# Patient Record
Sex: MALE | Employment: FULL TIME | URBAN - METROPOLITAN AREA
[De-identification: names, ages, dates, MRNs, and addresses within clinical notes are randomized per-mention and may not be internally consistent; named-entity substitution may affect disease eponyms.]

---

## 2017-06-26 ENCOUNTER — APPOINTMENT (OUTPATIENT)
Dept: GENERAL RADIOLOGY | Age: 52
End: 2017-06-26
Attending: EMERGENCY MEDICINE
Payer: COMMERCIAL

## 2017-06-26 ENCOUNTER — APPOINTMENT (OUTPATIENT)
Dept: NUCLEAR MEDICINE | Age: 52
End: 2017-06-26
Attending: NURSE PRACTITIONER
Payer: COMMERCIAL

## 2017-06-26 ENCOUNTER — HOSPITAL ENCOUNTER (EMERGENCY)
Age: 52
Discharge: HOME | End: 2017-06-26
Attending: EMERGENCY MEDICINE
Payer: COMMERCIAL

## 2017-06-26 VITALS
DIASTOLIC BLOOD PRESSURE: 70 MMHG | RESPIRATION RATE: 10 BRPM | BODY MASS INDEX: 28.56 KG/M2 | OXYGEN SATURATION: 99 % | TEMPERATURE: 98.2 F | HEIGHT: 71 IN | SYSTOLIC BLOOD PRESSURE: 106 MMHG | HEART RATE: 73 BPM | WEIGHT: 204 LBS

## 2017-06-26 DIAGNOSIS — R07.89 ATYPICAL CHEST PAIN: Primary | ICD-10-CM

## 2017-06-26 LAB
ALBUMIN SERPL BCP-MCNC: 3.9 G/DL (ref 3.5–5)
ALBUMIN/GLOB SERPL: 1.3 {RATIO} (ref 1.1–2.2)
ALP SERPL-CCNC: 87 U/L (ref 45–117)
ALT SERPL-CCNC: 27 U/L (ref 12–78)
ANION GAP BLD CALC-SCNC: 12 MMOL/L (ref 5–15)
AST SERPL W P-5'-P-CCNC: 35 U/L (ref 15–37)
ATRIAL RATE: 65 BPM
ATRIAL RATE: 67 BPM
BASOPHILS # BLD AUTO: 0 K/UL (ref 0–0.1)
BASOPHILS # BLD: 0 % (ref 0–1)
BILIRUB SERPL-MCNC: 0.7 MG/DL (ref 0.2–1)
BUN SERPL-MCNC: 16 MG/DL (ref 6–20)
BUN/CREAT SERPL: 13 (ref 12–20)
CALCIUM SERPL-MCNC: 9 MG/DL (ref 8.5–10.1)
CALCULATED P AXIS, ECG09: 29 DEGREES
CALCULATED P AXIS, ECG09: 3 DEGREES
CALCULATED R AXIS, ECG10: 33 DEGREES
CALCULATED R AXIS, ECG10: 41 DEGREES
CALCULATED T AXIS, ECG11: 49 DEGREES
CALCULATED T AXIS, ECG11: 54 DEGREES
CHLORIDE SERPL-SCNC: 105 MMOL/L (ref 97–108)
CO2 SERPL-SCNC: 23 MMOL/L (ref 21–32)
CREAT SERPL-MCNC: 1.19 MG/DL (ref 0.7–1.3)
DIAGNOSIS, 93000: NORMAL
DIAGNOSIS, 93000: NORMAL
EOSINOPHIL # BLD: 0.1 K/UL (ref 0–0.4)
EOSINOPHIL NFR BLD: 1 % (ref 0–7)
ERYTHROCYTE [DISTWIDTH] IN BLOOD BY AUTOMATED COUNT: 12.4 % (ref 11.5–14.5)
GLOBULIN SER CALC-MCNC: 3 G/DL (ref 2–4)
GLUCOSE SERPL-MCNC: 109 MG/DL (ref 65–100)
HCT VFR BLD AUTO: 39.2 % (ref 36.6–50.3)
HGB BLD-MCNC: 13 G/DL (ref 12.1–17)
LYMPHOCYTES # BLD AUTO: 34 % (ref 12–49)
LYMPHOCYTES # BLD: 2.1 K/UL (ref 0.8–3.5)
MAGNESIUM SERPL-MCNC: 2 MG/DL (ref 1.6–2.4)
MCH RBC QN AUTO: 31.3 PG (ref 26–34)
MCHC RBC AUTO-ENTMCNC: 33.2 G/DL (ref 30–36.5)
MCV RBC AUTO: 94.5 FL (ref 80–99)
MONOCYTES # BLD: 0.6 K/UL (ref 0–1)
MONOCYTES NFR BLD AUTO: 10 % (ref 5–13)
NEUTS SEG # BLD: 3.3 K/UL (ref 1.8–8)
NEUTS SEG NFR BLD AUTO: 55 % (ref 32–75)
P-R INTERVAL, ECG05: 164 MS
P-R INTERVAL, ECG05: 196 MS
PLATELET # BLD AUTO: 212 K/UL (ref 150–400)
POTASSIUM SERPL-SCNC: 3.4 MMOL/L (ref 3.5–5.1)
PROT SERPL-MCNC: 6.9 G/DL (ref 6.4–8.2)
Q-T INTERVAL, ECG07: 412 MS
Q-T INTERVAL, ECG07: 416 MS
QRS DURATION, ECG06: 100 MS
QRS DURATION, ECG06: 102 MS
QTC CALCULATION (BEZET), ECG08: 428 MS
QTC CALCULATION (BEZET), ECG08: 439 MS
RBC # BLD AUTO: 4.15 M/UL (ref 4.1–5.7)
SODIUM SERPL-SCNC: 140 MMOL/L (ref 136–145)
TROPONIN I BLD-MCNC: <0.04 NG/ML (ref 0–0.08)
TROPONIN I SERPL-MCNC: <0.04 NG/ML
TROPONIN I SERPL-MCNC: <0.04 NG/ML
VENTRICULAR RATE, ECG03: 65 BPM
VENTRICULAR RATE, ECG03: 67 BPM
WBC # BLD AUTO: 6.1 K/UL (ref 4.1–11.1)

## 2017-06-26 PROCEDURE — 80053 COMPREHEN METABOLIC PANEL: CPT | Performed by: EMERGENCY MEDICINE

## 2017-06-26 PROCEDURE — A9500 TC99M SESTAMIBI: HCPCS

## 2017-06-26 PROCEDURE — 84484 ASSAY OF TROPONIN QUANT: CPT | Performed by: EMERGENCY MEDICINE

## 2017-06-26 PROCEDURE — 74011250637 HC RX REV CODE- 250/637: Performed by: NURSE PRACTITIONER

## 2017-06-26 PROCEDURE — 74011250637 HC RX REV CODE- 250/637: Performed by: EMERGENCY MEDICINE

## 2017-06-26 PROCEDURE — 85025 COMPLETE CBC W/AUTO DIFF WBC: CPT | Performed by: EMERGENCY MEDICINE

## 2017-06-26 PROCEDURE — 93306 TTE W/DOPPLER COMPLETE: CPT

## 2017-06-26 PROCEDURE — 36415 COLL VENOUS BLD VENIPUNCTURE: CPT | Performed by: EMERGENCY MEDICINE

## 2017-06-26 PROCEDURE — 99285 EMERGENCY DEPT VISIT HI MDM: CPT

## 2017-06-26 PROCEDURE — 83735 ASSAY OF MAGNESIUM: CPT | Performed by: NURSE PRACTITIONER

## 2017-06-26 PROCEDURE — 93041 RHYTHM ECG TRACING: CPT

## 2017-06-26 PROCEDURE — 71020 XR CHEST PA LAT: CPT

## 2017-06-26 PROCEDURE — 93005 ELECTROCARDIOGRAM TRACING: CPT

## 2017-06-26 RX ORDER — METFORMIN HYDROCHLORIDE 1000 MG/1
1000 TABLET ORAL 2 TIMES DAILY WITH MEALS
COMMUNITY

## 2017-06-26 RX ORDER — ATORVASTATIN CALCIUM 80 MG/1
80 TABLET, FILM COATED ORAL DAILY
COMMUNITY

## 2017-06-26 RX ORDER — PIOGLITAZONEHYDROCHLORIDE 15 MG/1
45 TABLET ORAL DAILY
COMMUNITY

## 2017-06-26 RX ORDER — ATORVASTATIN CALCIUM 20 MG/1
80 TABLET, FILM COATED ORAL DAILY
COMMUNITY
End: 2017-06-26

## 2017-06-26 RX ORDER — LISINOPRIL 10 MG/1
10 TABLET ORAL DAILY
COMMUNITY

## 2017-06-26 RX ORDER — FENOFIBRATE 48 MG/1
48 TABLET, COATED ORAL DAILY
COMMUNITY
End: 2017-06-26

## 2017-06-26 RX ORDER — SODIUM CHLORIDE 0.9 % (FLUSH) 0.9 %
20 SYRINGE (ML) INJECTION
Status: COMPLETED | OUTPATIENT
Start: 2017-06-26 | End: 2017-06-26

## 2017-06-26 RX ORDER — FENOFIBRATE 145 MG/1
TABLET, COATED ORAL DAILY
COMMUNITY

## 2017-06-26 RX ORDER — POTASSIUM CHLORIDE 750 MG/1
40 TABLET, FILM COATED, EXTENDED RELEASE ORAL
Status: COMPLETED | OUTPATIENT
Start: 2017-06-26 | End: 2017-06-26

## 2017-06-26 RX ADMIN — Medication 10 ML: at 13:32

## 2017-06-26 RX ADMIN — NITROGLYCERIN 1 INCH: 20 OINTMENT TOPICAL at 06:37

## 2017-06-26 RX ADMIN — POTASSIUM CHLORIDE 40 MEQ: 750 TABLET, FILM COATED, EXTENDED RELEASE ORAL at 09:52

## 2017-06-26 NOTE — ED NOTES
1:59 PM  Change of shift. Care of patient taken over from Dr. Andrea Escalante; H&P reviewed, handoff complete. Awaiting stress test results. PROGRESS NOTE:  3:54 PM  Nuclear stress test negative. Will discharge home with close cards follow up.

## 2017-06-26 NOTE — ED PROVIDER NOTES
HPI Comments: 46 y.o. male with past medical history significant for DM, CAD, elevated cholesterol who presents via EMS with chief complaint of chest pain. Patient states he woke up this morning around \"6768\" experiencing pain in left shoulder and left arm. He explains that he thought he \"slept on shoulder wrong\" but notes pain persisted. Patient notes he proceeded to develop tightness in mid-right anterior chest and reports associated diaphoresis, mild nausea and SOB secondary to hyperventilating. Patient explains that sx began to resolve upon EMS arrival. Patient was given 325 mg ASA en route. Upon arrival to ED, patient notes he still has chest tightness and mild left shoulder pain. He denies any exacerbation in chest discomfort with deep breathing or palpation. Patient denies any recent travel, surgery or plane rides. He denies any fever, chills, rhinorrhea, cough, congestion, abdominal pain, urinary problems. There are no other acute medical concerns at this time. Social hx: non-smoker. +ETOH use; socially. Denies drug use. PCP: Chery Demarco MD    Note written by Fawn Kidd. Arin Ferreira, as dictated by Brynn Severin, MD 6:32 AM      The history is provided by the patient and the spouse. No  was used. No past medical history on file. No past surgical history on file. No family history on file. Social History     Social History    Marital status:      Spouse name: N/A    Number of children: N/A    Years of education: N/A     Occupational History    Not on file. Social History Main Topics    Smoking status: Not on file    Smokeless tobacco: Not on file    Alcohol use Not on file    Drug use: Not on file    Sexual activity: Not on file     Other Topics Concern    Not on file     Social History Narrative         ALLERGIES: Review of patient's allergies indicates no known allergies.     Review of Systems   Constitutional: Positive for diaphoresis (resolved). Negative for chills, fatigue and fever. HENT: Negative for congestion, rhinorrhea and sore throat. Eyes: Negative for visual disturbance. Respiratory: Positive for chest tightness (mid-right ) and shortness of breath (resolved). Negative for cough. Gastrointestinal: Positive for nausea (mild; resolved). Negative for abdominal pain, diarrhea and vomiting. Genitourinary: Negative for difficulty urinating, dysuria and hematuria. Musculoskeletal: Positive for arthralgias (left shoulder; mild) and myalgias (left arm; resolved). Negative for back pain and neck pain. Skin: Negative for rash and wound. Neurological: Negative for dizziness and headaches. All other systems reviewed and are negative. Vitals:    06/26/17 0629 06/26/17 0700 06/26/17 0730 06/26/17 0800   BP: 130/74 118/70 122/66 126/67   Pulse: 79 68 67 70   Resp: 20 14 12 17   Temp: 98 °F (36.7 °C)      SpO2: 100% 99% 99% 99%   Weight: 92.5 kg (204 lb)      Height: 5' 11\" (1.803 m)               Physical Exam   Const:  No acute distress, well developed, well nourished  Head:  Atraumatic, normocephalic  Eyes:  PERRL, conjunctiva normal, no scleral icterus  Neck:  Supple, trachea midline  Cardiovascular:  RRR, no murmurs, no gallops, no rubs  Resp:  No resp distress, no increased work of breathing, no wheezes, no rhonchi, no rales,  Abd:  Soft, non-tender, non-distended, no rebound, no guarding, no CVA tenderness  :  Deferred  MSK:  No pedal edema, normal ROM  Neuro:  Alert and oriented x3, no cranial nerve defect  Skin:  Warm, dry, intact  Psych: normal mood and affect, behavior is normal, judgement and thought content is normal  Note written by Terence Tracey, as dictated by Arianna Carcamo MD 6:32 AM      MDM  Number of Diagnoses or Management Options  Atypical chest pain:      Amount and/or Complexity of Data Reviewed  Clinical lab tests: ordered and reviewed  Tests in the radiology section of CPT®: ordered and reviewed  Review and summarize past medical records: yes    Patient Progress  Patient progress: stable    ED Course     Pt. Presents to the ER with atypical CP sx. He says that he is feeling better and is pain free in the ER. Negative cardiac enzymes. Pt. Seen by cardiology and to have a stress echo. Pt. Signed out to Dr. Guy Gardiner to f/u on those results. Procedures    CONSULT NOTE:  8:31 AM Janay Whatley MD spoke with Dr. Brendon Strange, Consult for Cardiology. Discussed available diagnostic tests and clinical findings. She is in agreement with care plans as outlined. Dr. Brendon Strange will see patient.

## 2017-06-26 NOTE — ED TRIAGE NOTES
Triage: Woke up at 0530 with left shoulder pain and shortness of breath, + diaphoretic also complaining of chest discomfort 3/10.  325 Mg ASA given in route EKG- NSR. No cardiac history.

## 2017-06-26 NOTE — CONSULTS
CARDIOLOGY CONSULT NOTE     [facility] [address], [address] [place] [address], [facility]   [phone] fax [phone]    Name: Elida Elias  1965 [id number]  6/29/2017 8:46 AM     Assessment/Plan:     1. Chest pain/left arm pain - initial troponin negative and initial ECG without acute ischemic changes, symptoms improved with NTG and ASA, will repeat troponin and ECG now, will proceed with exercise nuclear stress test to assess for ischemia given his symptoms and risk factors for CAD  2. Dyslipidemia - on atorvastatin and tricor   3. DM type 2 - most recent A1C 7.0% per his report, on metformin, januvia and actos, on lisinopril as well but denies hx of HTN  4. Hypokalemia - K 3.4, will give KCL 40meq PO once now    Soc Hx: no tobacco use, drinks 1-2 beers every other week, no drug use  Fam Hx: mother with PCI/stent in her [de-identified], has aortic aneurysm and HTN and now in her 80s, father with HTN and dyslipidemia in his 80s    Admit Date: 6/26/2017     Admit Diagnosis: There are no admission diagnoses documented for this encounter. Primary Care Physician:Myra Sanders MD     Attending Provider: No att. providers found  Primary Cardiologist: None  Consulting Cardiologist: Dr. Britney Martinez: Chest pain  Requesting Physician: Dr. Sivakumar Darby    Subjective:     Elida Elias is a 46 y.o. male with hx of dyslipidemia and DM type 2 who presented to ER this AM with c/o left arm pain, chest pain and diaphoresis. Patient states he woke up this morning around 515am and noticed pain in his left arm and shoulder. Initially he thought he had \"slept on his shoulder wrong\" but notes pain persisted and about 30 minutes later he developed midsternal chest tightness with associated diaphoresis, dizziness and \"hyperventilating. \"  He called EMS and came to ER. Says his symptoms are very mild now and estimates that he got his NTG paste almost an hour ago now.   Patient was given 325 mg ASA en route. Says he has had milder forms of chest tightness before, not associated with activity and usually very brief without any other associated symptoms. Denies frequent dizziness at home, only has had syncope in relation to donating blood. Denies any LE edema. Reports having palpitations occasionally, \"not that often\", and episodes last a minute or less. Admits to drinking about 6-8 cups of coffee/day, sometimes as many as 10 cups of coffee if he is really tired and associated episodes of palpitations with increased coffee intake. He has not had any coffee yet today. Walks 1.5 hours three days/week. Admits to being under a lot of stress with kids in college and trying to figure out what to do with elderly parents. Review of Symptoms:  Pertinent items are noted in HPI. Previous treatment/evaluation includes none . Cardiac risk factors: dyslipidemia, diabetes mellitus, male gender, stress. Past Medical History:   Diagnosis Date    CAD (coronary artery disease)     [facility]      History reviewed. No pertinent surgical history. No current facility-administered medications for this encounter. Current Outpatient Prescriptions   Medication Sig    lisinopril (PRINIVIL, ZESTRIL) 10 mg tablet Take 10 mg by mouth daily.  SITagliptin (JANUVIA) 50 mg tablet Take 50 mg by mouth daily.  pioglitazone (ACTOS) 15 mg tablet Take 45 mg by mouth daily.  metFORMIN (GLUCOPHAGE) 1,000 mg tablet Take 1,000 mg by mouth two (2) times daily (with meals).  atorvastatin (LIPITOR) 80 mg tablet Take 80 mg by mouth daily.  fenofibrate nanocrystallized (TRICOR) 145 mg tablet Take  by mouth daily. No Known Allergies   History reviewed. No pertinent family history.    Social History     Social History    Marital status:      Spouse name: N/A    Number of children: N/A    Years of education: N/A     Social History Main Topics    Smoking status: Never Smoker    Smokeless tobacco: Never Used    Alcohol use Yes      Comment: social drinker    Drug use: No    Sexual activity: Not Asked     Other Topics Concern    None     Social History Narrative    None          Objective:      Physical Exam  Vitals:    06/26/17 1230 06/26/17 1541 06/26/17 1542 06/26/17 1630   BP: 116/69 128/53 128/53 106/70   Pulse: 76 80  73   Resp: 19 18  10   Temp:  98 °F (36.7 °C)  98.2 °F (36.8 °C)   SpO2: 100% 99%  99%   Weight:       Height:           General:  Alert, cooperative, no distress, appears stated age. Eyes:  Conjunctivae/corneas clear. Ears:  Normal external ear canals both ears. Nose: Nares normal.     Mouth/Throat: Moist mucous membranes. Neck: Supple, symmetrical, trachea midline, no carotid bruit and no JVD. Back:   Symmetric, no curvature. ROM normal.    Lungs:   Clear to auscultation bilaterally. Heart:  Regular rate and rhythm, S1, S2 normal, no murmur, click, rub or gallop. Abdomen:   Soft, non-tender. Bowel sounds normal. No masses,  No organomegaly. Extremities: Extremities normal, atraumatic, no cyanosis or edema. Vascular: 2+ and symmetric all extremities. Skin: Skin color normal. No rashes or lesions   Lymph nodes: Not assessed   Neurologic: CNII-XII intact. Normal strength throughout. Telemetry: normal sinus rhythm  ECG: normal EKG, normal sinus rhythm    Data Review:     No results for input(s): CPK, TROIQ in the last 72 hours. No lab exists for component: CKQMB, CPKMB, BMPP  No results for input(s): NA, K, CL, CO2, BUN, CREA, GLU, PHOS, CA in the last 72 hours. No results for input(s): WBC, HGB, HCT, PLT, HGBEXT, HCTEXT, PLTEXT, HGBEXT, HCTEXT, PLTEXT in the last 72 hours. No results for input(s): PTP, INR, GPT, SGOT, AP in the last 72 hours. No lab exists for component: PTTP, INREXT, INREXT  No results for input(s): CHOL, LDLC in the last 72 hours.     No lab exists for component: TGL, HDLC,  HBA1C  No results for input(s): CRP, TSH, TSHEXT, TSHEXT in the last 72 hours. No lab exists for component: ESR  Thank you very much for this referral. I appreciate the opportunity to participate in this patient's care. I will follow along with above stated plan.     Patel Mcmullen MD  [facility] [address], [address] [place] [address]  [place] [phone] MD Tommy

## 2017-06-26 NOTE — ED NOTES
Patient has received discharge instructions from ER MD, verbalizes understanding. Ambulatory upon discharge with wife.

## 2017-06-26 NOTE — DISCHARGE INSTRUCTIONS
We hope that we have addressed all of your medical concerns. The examination and treatment you received in the Emergency Department were for an emergent problem and were not intended as complete care. It is important that you follow up with your healthcare provider(s) for ongoing care. If your symptoms worsen or do not improve as expected, and you are unable to reach your usual health care provider(s), you should return to the Emergency Department. Today's healthcare is undergoing tremendous change, and patient satisfaction surveys are one of the many tools to assess the quality of medical care. You may receive a survey from the [employer] regarding your experience in the Emergency Department. I hope that your experience has been completely positive, particularly the medical care that I provided. As such, please participate in the survey; anything less than excellent does not meet my expectations or intentions. [facility] and [facility] participate in nationally recognized quality of care measures. If your blood pressure is greater than 120/80, as reported below, we urge that you seek medical care to address the potential of high blood pressure, commonly known as hypertension. Hypertension can be hereditary or can be caused by certain medical conditions, pain, stress, or \"white coat syndrome. \"       Please make an appointment with your health care provider(s) for follow up of your Emergency Department visit.        VITALS:   Patient Vitals for the past 8 hrs:   Temp Pulse Resp BP SpO2   06/26/17 1542 - - - 128/53 -   06/26/17 1541 98 °F (36.7 °C) 80 18 128/53 99 %   06/26/17 1230 - 76 19 116/69 100 %   06/26/17 1200 - 65 14 110/62 98 %   06/26/17 1130 - 70 13 118/70 100 %   06/26/17 1100 - 71 16 112/61 100 %   06/26/17 1030 - 72 12 117/67 99 %   06/26/17 0800 - 70 17 126/67 99 %          Thank you for allowing us to provide you with medical care today. We realize that you have many choices for your emergency care needs. Please choose us in the future for any continued health care needs. Gila Pratt[address].   Office: [phone]            Recent Results (from the past 24 hour(s))   EKG, 12 LEAD, INITIAL    Collection Time: 06/26/17  6:24 AM   Result Value Ref Range    Ventricular Rate 65 BPM    Atrial Rate 65 BPM    P-R Interval 196 ms    QRS Duration 102 ms    Q-T Interval 412 ms    QTC Calculation (Bezet) 428 ms    Calculated P Axis 29 degrees    Calculated R Axis 41 degrees    Calculated T Axis 54 degrees    Diagnosis Normal sinus rhythm  No previous ECGs available      POC TROPONIN-I    Collection Time: 06/26/17  6:30 AM   Result Value Ref Range    Troponin-I (POC) <0.04 0.00 - 0.08 ng/mL   CBC WITH AUTOMATED DIFF    Collection Time: 06/26/17  6:34 AM   Result Value Ref Range    WBC 6.1 4.1 - 11.1 K/uL    RBC 4.15 4.10 - 5.70 M/uL    HGB 13.0 12.1 - 17.0 g/dL    HCT 39.2 36.6 - 50.3 %    MCV 94.5 80.0 - 99.0 FL    MCH 31.3 26.0 - 34.0 PG    MCHC 33.2 30.0 - 36.5 g/dL    RDW 12.4 11.5 - 14.5 %    PLATELET 912 222 - 662 K/uL    NEUTROPHILS 55 32 - 75 %    LYMPHOCYTES 34 12 - 49 %    MONOCYTES 10 5 - 13 %    EOSINOPHILS 1 0 - 7 %    BASOPHILS 0 0 - 1 %    ABS. NEUTROPHILS 3.3 1.8 - 8.0 K/UL    ABS. LYMPHOCYTES 2.1 0.8 - 3.5 K/UL    ABS. MONOCYTES 0.6 0.0 - 1.0 K/UL    ABS. EOSINOPHILS 0.1 0.0 - 0.4 K/UL    ABS.  BASOPHILS 0.0 0.0 - 0.1 K/UL   METABOLIC PANEL, COMPREHENSIVE    Collection Time: 06/26/17  6:34 AM   Result Value Ref Range    Sodium 140 136 - 145 mmol/L    Potassium 3.4 (L) 3.5 - 5.1 mmol/L    Chloride 105 97 - 108 mmol/L    CO2 23 21 - 32 mmol/L    Anion gap 12 5 - 15 mmol/L    Glucose 109 (H) 65 - 100 mg/dL    BUN 16 6 - 20 MG/DL    Creatinine 1.19 0.70 - 1.30 MG/DL    BUN/Creatinine ratio 13 12 - 20      GFR est AA >60 >60 ml/min/1.73m2    GFR est non-AA >60 >60 ml/min/1.73m2    Calcium 9.0 8.5 - 10.1 MG/DL    Bilirubin, total 0.7 0.2 - 1.0 MG/DL    ALT (SGPT) 27 12 - 78 U/L    AST (SGOT) 35 15 - 37 U/L    Alk. phosphatase 87 45 - 117 U/L    Protein, total 6.9 6.4 - 8.2 g/dL    Albumin 3.9 3.5 - 5.0 g/dL    Globulin 3.0 2.0 - 4.0 g/dL    A-G Ratio 1.3 1.1 - 2.2     TROPONIN I    Collection Time: 06/26/17  6:34 AM   Result Value Ref Range    Troponin-I, Qt. <0.04 <0.05 ng/mL   MAGNESIUM    Collection Time: 06/26/17  6:34 AM   Result Value Ref Range    Magnesium 2.0 1.6 - 2.4 mg/dL   ECG RHYTHM ANALYSIS ADULT    Collection Time: 06/26/17  9:56 AM   Result Value Ref Range    Ventricular Rate 67 BPM    Atrial Rate 67 BPM    P-R Interval 164 ms    QRS Duration 100 ms    Q-T Interval 416 ms    QTC Calculation (Bezet) 439 ms    Calculated P Axis 3 degrees    Calculated R Axis 33 degrees    Calculated T Axis 49 degrees    Diagnosis       Normal sinus rhythm  When compared with ECG of 26-JUN-2017 06:24,  MANUAL COMPARISON REQUIRED, DATA IS UNCONFIRMED     TROPONIN I    Collection Time: 06/26/17 10:00 AM   Result Value Ref Range    Troponin-I, Qt. <0.04 <0.05 ng/mL       Xr Chest Pa Lat    Result Date: 6/26/2017  EXAM:  XR CHEST PA LAT INDICATION:  Acute chest pain and shortness of breath. COMPARISON: None TECHNIQUE: PA and lateral chest views FINDINGS: Cardiac monitoring wires overlie the thorax. The cardiomediastinal and hilar contours are within normal limits. The pulmonary vasculature is within normal limits. The lungs and pleural spaces are clear. There is no pneumothorax. The visualized bones and upper abdomen are age-appropriate. IMPRESSION: No acute process. Nm Cardiac Spect W Strs/rest Mult    Result Date: 6/26/2017  EXAM:  NM CARDIAC SPECT W STRS/REST MULT INDICATION: Chest pain, normal EKG. COMPARISON:  None.  CORRELATIVE IMAGING STUDIES: TRACER: Tc 99m Sestamibi TECHNIQUE:  Resting SPECT images of the heart were obtained following the uneventful intravenous administration of 11 mCi of Tc 99m Sestamibi. Gated stress SPECT images of the heart were obtained following Jaron exercise protocol and the uneventful intravenous administration of 32 mCi of Tc 99m Sestamibi. Patient achieved 98 % of predicted maximum heart rate. FINDINGS:  The rest and stress perfusion images demonstrate no significant perfusion defect or evidence of myocardial reversibility. There is slight thinning of the inferior wall most likely attenuation artifact The gated images demonstrate normal global and regional wall motion and thickening. Left ventricular ejection fraction is 72 %. Impression: No definite evidence of ischemia nor infarction. Slight thinning of the inferior wall is most likely attenuation artifact. Left ventricular ejection fraction is 72 %. Chest Pain (Angina): After Your Visit to the Emergency Room  Your Care Instructions  You have chest pain called angina because at times your heart does not get enough blood. This is caused by coronary artery disease (CAD). It causes major blood vessels to the heart to become narrow or blocked. You did not have a heart attack, but CAD does increase your risk for a heart attack. Even though you have been released from the emergency room, you still need to watch for any problems. The doctor carefully checked you. But sometimes problems can develop later. A visit to the emergency room is only one step in your treatment. Even if you feel better, you still need to do what your doctor recommends, such as going to all suggested follow-up appointments and taking medicines exactly as directed. This will help you recover and help prevent future problems. How can you care for yourself at home? · If your doctor has given you nitroglycerin or other nitrate medicine, keep it with you at all times. If you have chest pain, sit down and rest, and take your nitroglycerin or other nitrates as directed.  If your chest pain does not get better after 5 minutes, call 911. · Take your medicine exactly as prescribed. Call your doctor if you think you are having a problem with your medicine. When should you call for help? Call 911 if:  · You passed out (lost consciousness). · You have chest pain that does not go away with rest or is not getting better within 5 minutes after you take a dose of nitroglycerin. · You have symptoms of a heart attack. These may include:  ¨ Chest pain or pressure, or a strange feeling in the chest.  ¨ Sweating. ¨ Shortness of breath. ¨ Nausea or vomiting. ¨ Pain, pressure, or a strange feeling in the back, neck, jaw, or upper belly or in one or both shoulders or arms. ¨ Lightheadedness or sudden weakness. ¨ A fast or irregular pulse. After you call 911, the  may tell you to chew 1 adult-strength or 2 to 4 low-dose aspirin. Wait for an ambulance. Do not try to drive yourself. · You have other symptoms that you think are a medical emergency. Return to the emergency room now if:  · You are having chest pain more often than usual, even if it goes away when you rest or take nitroglycerin. · You feel dizzy or lightheaded, or you feel like you may faint. Where can you learn more? Go to [employer]  Enter Q559 in the search box to learn more about \"Chest Pain (Angina): After Your Visit to the Emergency Room. \"   © 9854-3287 [employer], [employer]orporated. Care instructions adapted under license by Denisse Austin (which disclaims liability or warranty for this information). This care instruction is for use with your licensed healthcare professional. If you have questions about a medical condition or this instruction, always ask your healthcare professional. [place] [address] any warranty or liability for your use of this information. Content Version: 9.4.05358;  Last Revised: February 23, 2012

## 2017-06-26 NOTE — ED NOTES
Bedside and Verbal shift change report given to [address] (oncoming nurse) by Vesna Laurent RN (offgoing nurse). Report included the following information ED Summary.
